# Patient Record
Sex: FEMALE | Race: OTHER | Employment: UNEMPLOYED | URBAN - METROPOLITAN AREA
[De-identification: names, ages, dates, MRNs, and addresses within clinical notes are randomized per-mention and may not be internally consistent; named-entity substitution may affect disease eponyms.]

---

## 2018-10-19 ENCOUNTER — APPOINTMENT (OUTPATIENT)
Dept: GENERAL RADIOLOGY | Age: 53
End: 2018-10-19
Payer: MEDICAID

## 2018-10-19 ENCOUNTER — HOSPITAL ENCOUNTER (EMERGENCY)
Age: 53
Discharge: HOME | End: 2018-10-19
Payer: MEDICAID

## 2018-10-19 VITALS
OXYGEN SATURATION: 100 % | DIASTOLIC BLOOD PRESSURE: 70 MMHG | HEART RATE: 78 BPM | TEMPERATURE: 97.9 F | RESPIRATION RATE: 12 BRPM | SYSTOLIC BLOOD PRESSURE: 114 MMHG

## 2018-10-19 DIAGNOSIS — R73.9 HYPERGLYCEMIA: Primary | ICD-10-CM

## 2018-10-19 LAB
ALBUMIN SERPL-MCNC: 4.3 G/DL (ref 3.9–4.9)
ALP BLD-CCNC: 147 U/L (ref 40–130)
ALT SERPL-CCNC: 38 U/L (ref 0–33)
ANION GAP SERPL CALCULATED.3IONS-SCNC: 14 MEQ/L (ref 7–13)
AST SERPL-CCNC: 19 U/L (ref 0–35)
BASOPHILS ABSOLUTE: 0.1 K/UL (ref 0–0.2)
BASOPHILS RELATIVE PERCENT: 1 %
BETA-HYDROXYBUTYRATE: 1.7 MG/DL (ref 0.2–2.8)
BILIRUB SERPL-MCNC: 0.3 MG/DL (ref 0–1.2)
BUN BLDV-MCNC: 20 MG/DL (ref 6–20)
CALCIUM SERPL-MCNC: 10.3 MG/DL (ref 8.6–10.2)
CHLORIDE BLD-SCNC: 92 MEQ/L (ref 98–107)
CO2: 27 MEQ/L (ref 22–29)
CREAT SERPL-MCNC: 0.55 MG/DL (ref 0.5–0.9)
EOSINOPHILS ABSOLUTE: 0.2 K/UL (ref 0–0.7)
EOSINOPHILS RELATIVE PERCENT: 1.5 %
GFR AFRICAN AMERICAN: >60
GFR NON-AFRICAN AMERICAN: >60
GLOBULIN: 3.1 G/DL (ref 2.3–3.5)
GLUCOSE BLD-MCNC: 291 MG/DL (ref 60–115)
GLUCOSE BLD-MCNC: 366 MG/DL (ref 60–115)
GLUCOSE BLD-MCNC: 408 MG/DL (ref 74–109)
HCT VFR BLD CALC: 44.8 % (ref 37–47)
HEMOGLOBIN: 15.1 G/DL (ref 12–16)
LYMPHOCYTES ABSOLUTE: 2.8 K/UL (ref 1–4.8)
LYMPHOCYTES RELATIVE PERCENT: 26.5 %
MCH RBC QN AUTO: 31.6 PG (ref 27–31.3)
MCHC RBC AUTO-ENTMCNC: 33.8 % (ref 33–37)
MCV RBC AUTO: 93.4 FL (ref 82–100)
MONOCYTES ABSOLUTE: 0.8 K/UL (ref 0.2–0.8)
MONOCYTES RELATIVE PERCENT: 7.3 %
NEUTROPHILS ABSOLUTE: 6.6 K/UL (ref 1.4–6.5)
NEUTROPHILS RELATIVE PERCENT: 63.7 %
PDW BLD-RTO: 13 % (ref 11.5–14.5)
PERFORMED ON: ABNORMAL
PERFORMED ON: ABNORMAL
PLATELET # BLD: 253 K/UL (ref 130–400)
POTASSIUM SERPL-SCNC: 4.7 MEQ/L (ref 3.5–5.1)
RBC # BLD: 4.8 M/UL (ref 4.2–5.4)
SODIUM BLD-SCNC: 133 MEQ/L (ref 132–144)
TOTAL PROTEIN: 7.4 G/DL (ref 6.4–8.1)
WBC # BLD: 10.4 K/UL (ref 4.8–10.8)

## 2018-10-19 PROCEDURE — 85025 COMPLETE CBC W/AUTO DIFF WBC: CPT

## 2018-10-19 PROCEDURE — 71045 X-RAY EXAM CHEST 1 VIEW: CPT

## 2018-10-19 PROCEDURE — 2580000003 HC RX 258: Performed by: NURSE PRACTITIONER

## 2018-10-19 PROCEDURE — 36415 COLL VENOUS BLD VENIPUNCTURE: CPT

## 2018-10-19 PROCEDURE — 80053 COMPREHEN METABOLIC PANEL: CPT

## 2018-10-19 PROCEDURE — 82010 KETONE BODYS QUAN: CPT

## 2018-10-19 PROCEDURE — 99285 EMERGENCY DEPT VISIT HI MDM: CPT

## 2018-10-19 RX ORDER — 0.9 % SODIUM CHLORIDE 0.9 %
2000 INTRAVENOUS SOLUTION INTRAVENOUS ONCE
Status: COMPLETED | OUTPATIENT
Start: 2018-10-19 | End: 2018-10-19

## 2018-10-19 RX ADMIN — SODIUM CHLORIDE 2000 ML: 9 INJECTION, SOLUTION INTRAVENOUS at 13:01

## 2018-10-19 ASSESSMENT — ENCOUNTER SYMPTOMS
BACK PAIN: 0
EYE PAIN: 0
NAUSEA: 0
VOMITING: 0
ABDOMINAL PAIN: 0
DIARRHEA: 0
PHOTOPHOBIA: 0
RHINORRHEA: 0
SHORTNESS OF BREATH: 0
SORE THROAT: 0
COUGH: 0

## 2018-10-19 NOTE — ED NOTES
Pt alert talking on cell phone at this time, denies any pain, denies a blanket at this time     Nicolasa Valenzuela RN  10/19/18 [id number]

## 2022-08-22 ENCOUNTER — HOSPITAL ENCOUNTER (OUTPATIENT)
Dept: GENERAL RADIOLOGY | Age: 57
Discharge: HOME | End: 2022-08-24
Payer: MEDICAID

## 2022-08-22 DIAGNOSIS — S90.32XA CONTUSION OF LEFT FOOT, INITIAL ENCOUNTER: ICD-10-CM

## 2022-08-22 PROCEDURE — 73650 X-RAY EXAM OF HEEL: CPT

## 2022-10-27 ENCOUNTER — HOSPITAL ENCOUNTER (EMERGENCY)
Age: 57
Discharge: HOME | End: 2022-10-27
Attending: EMERGENCY MEDICINE
Payer: MEDICAID

## 2022-10-27 VITALS
BODY MASS INDEX: 18.4 KG/M2 | HEIGHT: 62 IN | SYSTOLIC BLOOD PRESSURE: 147 MMHG | DIASTOLIC BLOOD PRESSURE: 66 MMHG | HEART RATE: 85 BPM | RESPIRATION RATE: 18 BRPM | WEIGHT: 100 LBS | TEMPERATURE: 98.1 F | OXYGEN SATURATION: 100 %

## 2022-10-27 DIAGNOSIS — R73.9 HYPERGLYCEMIA: Primary | ICD-10-CM

## 2022-10-27 LAB
ALBUMIN SERPL-MCNC: 2.5 G/DL (ref 3.5–4.6)
ALP BLD-CCNC: 121 U/L (ref 40–130)
ALT SERPL-CCNC: 19 U/L (ref 0–33)
ANION GAP SERPL CALCULATED.3IONS-SCNC: 8 MEQ/L (ref 9–15)
AST SERPL-CCNC: 9 U/L (ref 0–35)
BASE EXCESS VENOUS: -2 (ref -3–3)
BASOPHILS ABSOLUTE: 0 K/UL (ref 0–0.2)
BASOPHILS RELATIVE PERCENT: 0.4 %
BETA-HYDROXYBUTYRATE: 4 MG/DL (ref 0.2–2.8)
BILIRUB SERPL-MCNC: <0.2 MG/DL (ref 0.2–0.7)
BUN BLDV-MCNC: 13 MG/DL (ref 6–20)
CALCIUM IONIZED: 1.09 MMOL/L (ref 1.12–1.32)
CALCIUM SERPL-MCNC: 6.2 MG/DL (ref 8.5–9.9)
CHLORIDE BLD-SCNC: 114 MEQ/L (ref 95–107)
CHP ED QC CHECK: YES
CO2: 20 MEQ/L (ref 20–31)
CREAT SERPL-MCNC: 0.43 MG/DL (ref 0.5–0.9)
EOSINOPHILS ABSOLUTE: 0.1 K/UL (ref 0–0.7)
EOSINOPHILS RELATIVE PERCENT: 1.1 %
GFR SERPL CREATININE-BSD FRML MDRD: >60 ML/MIN/{1.73_M2}
GFR SERPL CREATININE-BSD FRML MDRD: >60 ML/MIN/{1.73_M2}
GLOBULIN: 1.7 G/DL (ref 2.3–3.5)
GLUCOSE BLD-MCNC: 326 MG/DL (ref 70–99)
GLUCOSE BLD-MCNC: 386 MG/DL (ref 70–99)
GLUCOSE BLD-MCNC: 412 MG/DL (ref 70–99)
GLUCOSE BLD-MCNC: 515 MG/DL
GLUCOSE BLD-MCNC: 515 MG/DL (ref 70–99)
HCO3 VENOUS: 23.5 MMOL/L (ref 23–29)
HCT VFR BLD CALC: 37.5 % (ref 37–47)
HEMOGLOBIN: 12.6 G/DL (ref 12–16)
HEMOGLOBIN: 15.6 GM/DL (ref 12–16)
LACTATE: 1.32 MMOL/L (ref 0.4–2)
LYMPHOCYTES ABSOLUTE: 2.5 K/UL (ref 1–4.8)
LYMPHOCYTES RELATIVE PERCENT: 29.1 %
MCH RBC QN AUTO: 31.8 PG (ref 27–31.3)
MCHC RBC AUTO-ENTMCNC: 33.8 % (ref 33–37)
MCV RBC AUTO: 94.2 FL (ref 79.4–94.8)
MONOCYTES ABSOLUTE: 0.5 K/UL (ref 0.2–0.8)
MONOCYTES RELATIVE PERCENT: 5.8 %
NEUTROPHILS ABSOLUTE: 5.5 K/UL (ref 1.4–6.5)
NEUTROPHILS RELATIVE PERCENT: 63.6 %
O2 SAT, VEN: 47 %
PCO2, VEN: 43.7 MM HG (ref 40–50)
PDW BLD-RTO: 13 % (ref 11.5–14.5)
PERFORMED ON: ABNORMAL
PH VENOUS: 7.34 (ref 7.32–7.42)
PLATELET # BLD: 129 K/UL (ref 130–400)
PO2, VEN: 28 MM HG
POC CHLORIDE: 107 MEQ/L (ref 99–110)
POC CREATININE: 0.5 MG/DL (ref 0.6–1.2)
POC HEMATOCRIT: 46 % (ref 36–48)
POC POTASSIUM: 3.7 MEQ/L (ref 3.5–5.1)
POC SAMPLE TYPE: ABNORMAL
POC SODIUM: 140 MEQ/L (ref 136–145)
POTASSIUM SERPL-SCNC: 3 MEQ/L (ref 3.4–4.9)
RBC # BLD: 3.98 M/UL (ref 4.2–5.4)
SODIUM BLD-SCNC: 142 MEQ/L (ref 135–144)
TCO2 CALC VENOUS: 25 MMOL/L
TOTAL PROTEIN: 4.2 G/DL (ref 6.3–8)
WBC # BLD: 8.6 K/UL (ref 4.8–10.8)

## 2022-10-27 PROCEDURE — 99284 EMERGENCY DEPT VISIT MOD MDM: CPT

## 2022-10-27 PROCEDURE — 83605 ASSAY OF LACTIC ACID: CPT

## 2022-10-27 PROCEDURE — 36415 COLL VENOUS BLD VENIPUNCTURE: CPT

## 2022-10-27 PROCEDURE — 36600 WITHDRAWAL OF ARTERIAL BLOOD: CPT

## 2022-10-27 PROCEDURE — 85014 HEMATOCRIT: CPT

## 2022-10-27 PROCEDURE — 94761 N-INVAS EAR/PLS OXIMETRY MLT: CPT

## 2022-10-27 PROCEDURE — 80053 COMPREHEN METABOLIC PANEL: CPT

## 2022-10-27 PROCEDURE — 82010 KETONE BODYS QUAN: CPT

## 2022-10-27 PROCEDURE — 82803 BLOOD GASES ANY COMBINATION: CPT

## 2022-10-27 PROCEDURE — 84132 ASSAY OF SERUM POTASSIUM: CPT

## 2022-10-27 PROCEDURE — 6370000000 HC RX 637 (ALT 250 FOR IP): Performed by: EMERGENCY MEDICINE

## 2022-10-27 PROCEDURE — 82565 ASSAY OF CREATININE: CPT

## 2022-10-27 PROCEDURE — 2580000003 HC RX 258: Performed by: EMERGENCY MEDICINE

## 2022-10-27 PROCEDURE — 82435 ASSAY OF BLOOD CHLORIDE: CPT

## 2022-10-27 PROCEDURE — 82948 REAGENT STRIP/BLOOD GLUCOSE: CPT

## 2022-10-27 PROCEDURE — 82330 ASSAY OF CALCIUM: CPT

## 2022-10-27 PROCEDURE — 85025 COMPLETE CBC W/AUTO DIFF WBC: CPT

## 2022-10-27 PROCEDURE — 84295 ASSAY OF SERUM SODIUM: CPT

## 2022-10-27 RX ORDER — CLONIDINE HYDROCHLORIDE 0.2 MG/1
0.2 TABLET ORAL 2 TIMES DAILY
COMMUNITY

## 2022-10-27 RX ORDER — UREA 10 %
LOTION (ML) TOPICAL
COMMUNITY
Start: 2022-09-15

## 2022-10-27 RX ORDER — CLONIDINE HYDROCHLORIDE 0.2 MG/1
TABLET ORAL
COMMUNITY
Start: 2022-09-15

## 2022-10-27 RX ORDER — RANITIDINE 150 MG/1
150 TABLET ORAL 2 TIMES DAILY
COMMUNITY

## 2022-10-27 RX ORDER — ISOPROPYL ALCOHOL 70 ML/100ML
SWAB TOPICAL
COMMUNITY
Start: 2022-09-06

## 2022-10-27 RX ORDER — ACETAMINOPHEN 325 MG/1
TABLET ORAL
COMMUNITY
Start: 2022-09-15

## 2022-10-27 RX ORDER — GLIMEPIRIDE 1 MG/1
TABLET ORAL
COMMUNITY
Start: 2022-09-15

## 2022-10-27 RX ORDER — ENALAPRIL MALEATE 10 MG/1
10 TABLET ORAL 2 TIMES DAILY
COMMUNITY

## 2022-10-27 RX ORDER — MELOXICAM 15 MG/1
TABLET ORAL
COMMUNITY
Start: 2022-08-12

## 2022-10-27 RX ORDER — GLYBURIDE-METFORMIN HYDROCHLORIDE 5; 500 MG/1; MG/1
1 TABLET ORAL
COMMUNITY

## 2022-10-27 RX ORDER — CEPHALEXIN 500 MG/1
CAPSULE ORAL
COMMUNITY
Start: 2022-08-12

## 2022-10-27 RX ORDER — 0.9 % SODIUM CHLORIDE 0.9 %
1000 INTRAVENOUS SOLUTION INTRAVENOUS ONCE
Status: COMPLETED | OUTPATIENT
Start: 2022-10-27 | End: 2022-10-27

## 2022-10-27 RX ORDER — SITAGLIPTIN 100 MG/1
TABLET, FILM COATED ORAL
COMMUNITY
Start: 2022-09-20

## 2022-10-27 RX ORDER — 0.9 % SODIUM CHLORIDE 0.9 %
2000 INTRAVENOUS SOLUTION INTRAVENOUS ONCE
Status: DISCONTINUED | OUTPATIENT
Start: 2022-10-27 | End: 2022-10-27

## 2022-10-27 RX ORDER — CLOPIDOGREL BISULFATE 75 MG/1
75 TABLET ORAL DAILY
COMMUNITY

## 2022-10-27 RX ORDER — BLOOD SUGAR DIAGNOSTIC
STRIP MISCELLANEOUS
COMMUNITY
Start: 2022-08-29

## 2022-10-27 RX ORDER — SIMVASTATIN 20 MG
20 TABLET ORAL NIGHTLY
COMMUNITY

## 2022-10-27 RX ORDER — LISINOPRIL 20 MG/1
TABLET ORAL
COMMUNITY
Start: 2022-10-04

## 2022-10-27 RX ORDER — POTASSIUM BICARBONATE 25 MEQ/1
50 TABLET, EFFERVESCENT ORAL ONCE
Status: COMPLETED | OUTPATIENT
Start: 2022-10-27 | End: 2022-10-27

## 2022-10-27 RX ORDER — CLINDAMYCIN HYDROCHLORIDE 300 MG/1
CAPSULE ORAL
COMMUNITY
Start: 2022-08-09

## 2022-10-27 RX ORDER — ATORVASTATIN CALCIUM 80 MG/1
TABLET, FILM COATED ORAL
COMMUNITY
Start: 2022-09-15

## 2022-10-27 RX ORDER — MIRTAZAPINE 15 MG/1
TABLET, FILM COATED ORAL
COMMUNITY
Start: 2022-09-15

## 2022-10-27 RX ORDER — LISINOPRIL 20 MG/1
20 TABLET ORAL DAILY
COMMUNITY

## 2022-10-27 RX ORDER — LANCETS 30 GAUGE
EACH MISCELLANEOUS
COMMUNITY
Start: 2022-08-15

## 2022-10-27 RX ADMIN — POTASSIUM BICARBONATE 50 MEQ: 978 TABLET, EFFERVESCENT ORAL at 15:58

## 2022-10-27 RX ADMIN — SODIUM CHLORIDE 1000 ML: 9 INJECTION, SOLUTION INTRAVENOUS at 14:34

## 2022-10-27 ASSESSMENT — PAIN - FUNCTIONAL ASSESSMENT
PAIN_FUNCTIONAL_ASSESSMENT: NONE - DENIES PAIN
PAIN_FUNCTIONAL_ASSESSMENT: NONE - DENIES PAIN

## 2022-10-27 NOTE — ED PROVIDER NOTES
[address] [facility] ED  EMERGENCY DEPARTMENT ENCOUNTER      Pt Name: Bogdan Jamison  MRN: [id number]  [place] 1965  Date of evaluation: 10/27/2022  Provider: Len Marx MD    CHIEF COMPLAINT       Chief Complaint   Patient presents with    Hyperglycemia         HISTORY OF PRESENT ILLNESS   (Location/Symptom, Timing/Onset, Context/Setting, Quality, Duration, Modifying Factors, Severity)  Note limiting factors. 22-year-old female presenting with elevated blood sugar. Sent from primary doctor. She has no complaints. History of diabetes. Unsure if she is taking her medications. Nursing Notes were reviewed. REVIEW OF SYSTEMS    (2-9 systems for level 4, 10 or more for level 5)     Review of Systems   All other systems reviewed and are negative. Except as noted above the remainder of the review of systems was reviewed and negative. PAST MEDICAL HISTORY     Past Medical History:   Diagnosis Date    Asthma     Diabetes mellitus ([address]     Hypertension     Migraines          SURGICAL HISTORY     History reviewed. No pertinent surgical history.       CURRENT MEDICATIONS       Current Discharge Medication List        CONTINUE these medications which have NOT CHANGED    Details   Multiple Vitamins-Minerals (MULTI FOR HER 50+ PO)       acetaminophen (TYLENOL) 325 MG tablet       GNP Alcohol Swabs 70 % PADS       atorvastatin (LIPITOR) 80 MG tablet       Calcium Carb-Cholecalciferol (OYSTER SHELL CALCIUM W/D) 500-200 MG-UNIT TABS tablet       cephALEXin (KEFLEX) 500 MG capsule       clindamycin (CLEOCIN) 300 MG capsule       !! cloNIDine (CATAPRES) 0.2 MG tablet       !! cloNIDine (CATAPRES) 0.2 MG tablet Take 0.2 mg by mouth 2 times daily      clopidogrel (PLAVIX) 75 MG tablet Take 75 mg by mouth daily      enalapril (VASOTEC) 10 MG tablet Take 10 mg by mouth 2 times daily      folic acid (FOLVITE) 080 MCG tablet       glimepiride (AMARYL) 1 MG tablet       ONETOUCH ULTRA strip glyBURIDE-metFORMIN (GLUCOVANCE) 5-500 MG per tablet Take 1 tablet by mouth daily (with breakfast)      Lancets (BD LANCET ULTRAFINE 30G) MISC       !! lisinopril (PRINIVIL;ZESTRIL) 20 MG tablet       !! lisinopril (PRINIVIL;ZESTRIL) 20 MG tablet Take 20 mg by mouth daily      meloxicam (MOBIC) 15 MG tablet       !! metFORMIN (GLUCOPHAGE) 500 MG tablet Take 500 mg by mouth 2 times daily (with meals)      !! metFORMIN (GLUCOPHAGE) 1000 MG tablet       mirtazapine (REMERON) 15 MG tablet       mupirocin (BACTROBAN) 2 % ointment       raNITIdine (ZANTAC) 150 MG tablet Take 150 mg by mouth 2 times daily      simvastatin (ZOCOR) 20 MG tablet Take 20 mg by mouth nightly      JANUVIA 100 MG tablet       traMADol-acetaminophen (ULTRACET) 37.5-325 MG per tablet        !! - Potential duplicate medications found. Please discuss with provider. ALLERGIES     Patient has no allergy information on record. FAMILY HISTORY     History reviewed. No pertinent family history. SOCIAL HISTORY       Social History     Socioeconomic History    Marital status: Single     Spouse name: None    Number of children: None    Years of education: None    Highest education level: None   Tobacco Use    Smoking status: Every Day     Types: Cigarettes    Smokeless tobacco: Never   Substance and Sexual Activity    Alcohol use: Not Currently    Drug use: Never       SCREENINGS    Ricardo Coma Scale  Eye Opening: Spontaneous  Best Verbal Response: Oriented  Best Motor Response: Obeys commands  Monon Coma Scale Score: 15          PHYSICAL EXAM    (up to 7 for level 4, 8 or more for level 5)     ED Triage Vitals [10/27/22 1300]   BP Temp Temp src Heart Rate Resp SpO2 Height Weight   135/69 98.1 °F (36.7 °C) -- 89 18 99 % 5' 3\" (1.6 m) 120 lb (54.4 kg)       Physical Exam  Vitals and nursing note reviewed. Constitutional:       General: She is not in acute distress. Appearance: Normal appearance. She is well-developed.  She is not ill-appearing. HENT:      Head: Normocephalic and atraumatic. Mouth/Throat:      Mouth: Mucous membranes are moist.      Pharynx: Oropharynx is clear. Eyes:      Extraocular Movements: Extraocular movements intact. Conjunctiva/sclera: Conjunctivae normal.   Cardiovascular:      Rate and Rhythm: Normal rate and regular rhythm. Pulmonary:      Effort: Pulmonary effort is normal.      Breath sounds: Normal breath sounds. Abdominal:      General: Bowel sounds are normal.      Palpations: Abdomen is soft. Tenderness: There is no abdominal tenderness. Musculoskeletal:         General: No deformity. Normal range of motion. Cervical back: Normal range of motion and neck supple. Skin:     General: Skin is warm and dry. Capillary Refill: Capillary refill takes less than 2 seconds. Neurological:      General: No focal deficit present. Mental Status: She is alert and oriented to person, place, and time. Mental status is at baseline. Cranial Nerves: No cranial nerve deficit. Psychiatric:         Thought Content:  Thought content normal.       DIAGNOSTIC RESULTS     EKG: All EKG's are interpreted by the Emergency Department Physician who either signs or Co-signs this chart in the absence of a cardiologist.    RADIOLOGY:   Non-plain film images such as CT, Ultrasound and MRI are read by the radiologist. Plain radiographic images are visualized and preliminarily interpreted by the emergency physician with the below findings:    Interpretation per the Radiologist below, if available at the time of this note:    No orders to display       LABS:  Labs Reviewed   COMPREHENSIVE METABOLIC PANEL - Abnormal; Notable for the following components:       Result Value    Potassium 3.0 (*)     Chloride 114 (*)     Anion Gap 8 (*)     Glucose 386 (*)     Creatinine 0.43 (*)     Calcium 6.2 (*)     Total Protein 4.2 (*)     Albumin 2.5 (*)     Globulin 1.7 (*)     All other components within normal limits    Narrative:     Monique Hernandez  [facility] tel. [id number],  POTASSIUM results called to and read back by [facility], 10/27/2022  14:36, by [facility]   CBC WITH AUTO DIFFERENTIAL - Abnormal; Notable for the following components:    RBC 3.98 (*)     MCH 31.8 (*)     Platelets 698 (*)     All other components within normal limits   BETA-HYDROXYBUTYRATE - Abnormal; Notable for the following components:    Beta-Hydroxybutyrate 4.0 (*)     All other components within normal limits   POCT GLUCOSE - Abnormal; Notable for the following components:    POC Glucose 515 (*)     All other components within normal limits   POCT VENOUS - Abnormal; Notable for the following components:    POC Glucose 412 (*)     POC Creatinine 0.5 (*)     Calcium, Ionized 1.09 (*)     All other components within normal limits   POCT GLUCOSE - Normal   POCT EPOC BLOOD GAS, LACTIC ACID, ICA       All other labs were within normal range or not returned as of this dictation. EMERGENCY DEPARTMENT COURSE and DIFFERENTIAL DIAGNOSIS/MDM:   Vitals:    Vitals:    10/27/22 1445 10/27/22 1500 10/27/22 1505 10/27/22 1530   BP:  (!) 139/55  (!) 147/66   Pulse: 79 78 81 85   Resp: 18 16 20 18   Temp:       SpO2: 100% 100% 100% 100%   Weight:       Height:           MDM  Number of Diagnoses or Management Options  Hyperglycemia  Diagnosis management comments: After fluids sugar is improved, instructed her to restart insulin regimen as prescribed. Patient will be discharged home in good condition. Patient has been hemodynamically stable throughout ED course and is appropriate for outpatient follow up. Patient should follow up with PCP in 2-3 days or return to ED immediately for any new or worsening symptoms. Patient is well appearing on discharge and agreeable with plan of care. Procedures    CRITICAL CARE TIME   Total Critical Care time was 0 minutes, excluding separately reportable procedures.   There was a high probability of clinically significant/life threatening deterioration in the patient's condition which required my urgent intervention. FINAL IMPRESSION      1.  Hyperglycemia          DISPOSITION/PLAN   DISPOSITION Decision To Discharge 10/27/2022 03:54:23 PM      (Please note that portions of this note were completed with a voice recognition program.  Efforts were made to edit the dictations but occasionally words are mis-transcribed.)    Selina Dumas MD (electronically signed)  Attending Emergency Physician        Selina Dumas MD  10/27/22 1600

## 2022-10-27 NOTE — ED TRIAGE NOTES
Pt states that she has been having high BGL reading for a long time. Pt states that she takes insulin. Pt states that she was at her PCP for a follow up and was sent here.

## 2022-10-27 NOTE — ED TRIAGE NOTES
Per EMS pt has been having increased BGL for x2 months.  Pt went to [facility] today and was sent by EMS here for eval of increased BGL

## 2022-10-27 NOTE — ED NOTES
Blood glucose 326, Dr. Conrad Gift updated, 0 new orders, per  [id number] G[facility] Lee to d/c pt home. Pt a&ox4, skin w/d/tan, 0 distress, 0 pain, 0 n&v, 0 problems.      Natalee Carcamo, RN  10/27/22 4288

## 2022-11-09 LAB
FOLATE: 9.8 NG/ML
TSH SERPL DL<=0.05 MIU/L-ACNC: 1.49 MIU/L (ref 0.44–3.9)
VITAMIN B-12: 651 PG/ML (ref 211–911)

## 2022-12-20 ENCOUNTER — OFFICE VISIT (OUTPATIENT)
Dept: ENDOCRINOLOGY | Age: 57
End: 2022-12-20

## 2022-12-20 VITALS
DIASTOLIC BLOOD PRESSURE: 78 MMHG | SYSTOLIC BLOOD PRESSURE: 129 MMHG | HEART RATE: 83 BPM | OXYGEN SATURATION: 97 % | BODY MASS INDEX: 23.74 KG/M2 | HEIGHT: 62 IN | WEIGHT: 129 LBS

## 2022-12-20 DIAGNOSIS — E11.65 POORLY CONTROLLED DIABETES MELLITUS (HCC): ICD-10-CM

## 2022-12-20 DIAGNOSIS — E11.65 INADEQUATELY CONTROLLED DIABETES MELLITUS (HCC): Primary | ICD-10-CM

## 2022-12-20 LAB
CHP ED QC CHECK: NORMAL
GLUCOSE BLD-MCNC: 146 MG/DL
HBA1C MFR BLD: 6.9 %

## 2022-12-20 RX ORDER — BLOOD-GLUCOSE METER
EACH MISCELLANEOUS
COMMUNITY
Start: 2022-11-12

## 2022-12-20 RX ORDER — LOPERAMIDE HYDROCHLORIDE 2 MG/1
CAPSULE ORAL
COMMUNITY
Start: 2022-12-14

## 2022-12-20 RX ORDER — INSULIN LISPRO 100 [IU]/ML
INJECTION, SOLUTION INTRAVENOUS; SUBCUTANEOUS
COMMUNITY
Start: 2022-11-11 | End: 2022-12-20 | Stop reason: SDUPTHER

## 2022-12-20 RX ORDER — DM/P-EPHED/ACETAMINOPH/DOXYLAM
LIQUID (ML) ORAL
COMMUNITY
Start: 2022-11-11

## 2022-12-20 RX ORDER — FLASH GLUCOSE SENSOR
KIT MISCELLANEOUS
Qty: 2 EACH | Refills: 4 | Status: SHIPPED | OUTPATIENT
Start: 2022-12-20

## 2022-12-20 RX ORDER — PIOGLITAZONEHYDROCHLORIDE 30 MG/1
TABLET ORAL
COMMUNITY
Start: 2022-11-11

## 2022-12-20 RX ORDER — FLASH GLUCOSE SCANNING READER
EACH MISCELLANEOUS
Qty: 1 EACH | Refills: 0 | Status: SHIPPED | OUTPATIENT
Start: 2022-12-20

## 2022-12-20 RX ORDER — INSULIN LISPRO 100 [IU]/ML
INJECTION, SOLUTION INTRAVENOUS; SUBCUTANEOUS
Qty: 10 ADJUSTABLE DOSE PRE-FILLED PEN SYRINGE | Refills: 3 | Status: SHIPPED | OUTPATIENT
Start: 2022-12-20

## 2022-12-20 RX ORDER — TRAMADOL HYDROCHLORIDE 50 MG/1
TABLET ORAL
COMMUNITY
Start: 2022-12-14

## 2022-12-20 RX ORDER — BLOOD-GLUCOSE METER
EACH MISCELLANEOUS
COMMUNITY
Start: 2022-12-07

## 2022-12-20 RX ORDER — INSULIN GLARGINE 100 [IU]/ML
INJECTION, SOLUTION SUBCUTANEOUS
Qty: 5 ADJUSTABLE DOSE PRE-FILLED PEN SYRINGE | Refills: 4 | Status: SHIPPED | OUTPATIENT
Start: 2022-12-20

## 2022-12-20 RX ORDER — ASPIRIN 81 MG/1
TABLET, COATED ORAL
COMMUNITY
Start: 2022-11-11

## 2022-12-20 RX ORDER — INSULIN GLARGINE 100 [IU]/ML
INJECTION, SOLUTION SUBCUTANEOUS
COMMUNITY
Start: 2022-11-11 | End: 2022-12-20 | Stop reason: SDUPTHER

## 2022-12-20 RX ORDER — NICOTINE 21 MG/24HR
PATCH, TRANSDERMAL 24 HOURS TRANSDERMAL
COMMUNITY
Start: 2022-12-14

## 2022-12-20 RX ORDER — ATORVASTATIN CALCIUM 10 MG/1
TABLET, FILM COATED ORAL
COMMUNITY
Start: 2022-12-14

## 2022-12-20 RX ORDER — BUSPIRONE HYDROCHLORIDE 10 MG/1
TABLET ORAL
COMMUNITY
Start: 2022-11-15

## 2022-12-20 RX ORDER — PANTOPRAZOLE SODIUM 40 MG/1
TABLET, DELAYED RELEASE ORAL
COMMUNITY
Start: 2022-11-11

## 2022-12-20 RX ORDER — UREA 10 %
LOTION (ML) TOPICAL
COMMUNITY
Start: 2022-12-19

## 2022-12-20 RX ORDER — FAMOTIDINE 20 MG/1
TABLET, FILM COATED ORAL
COMMUNITY
Start: 2022-12-14

## 2022-12-20 NOTE — PROGRESS NOTES
2022    Assessment:       Diagnosis Orders   1. Inadequately controlled diabetes mellitus ([facility]  POCT Glucose    POCT glycosylated hemoglobin (Hb A1C)      2. Poorly controlled diabetes mellitus ([facility] [address] [facility]              PLAN:     Orders Placed This Encounter   Procedures    Basic Metabolic Panel     Standing Status:   Future     Standing Expiration Date:   2023    Hemoglobin A1C     Standing Status:   Future     Standing Expiration Date:   2023    C-Peptide     Standing Status:   Future     Standing Expiration Date:   2023    Glutamic Acid Decarboxylase     Standing Status:   Future     Standing Expiration Date:   2023    POCT Glucose    POCT glycosylated hemoglobin (Hb A1C)    MT CONT GLUC MNTR PHYSICIAN/QHP PROVIDED EQUIPTMENT     2-week blessing continuous glucose monitoring  Increase dose of Lantus to 35 at night made adjustments to Humalog sliding scale coverage A1c goal of 7 or lower more than 50% of 30-minute spent patient education counseling  Orders Placed This Encounter   Medications    LANTUS SOLOSTAR 100 UNIT/ML injection pen     Si  units at bedtime     Dispense:  5 Adjustable Dose Pre-filled Pen Syringe     Refill:  4    insulin lispro, 1 Unit Dial, (HUMALOG/ADMELOG) 100 UNIT/ML SOPN     Si-200 4 units  201-250 6 units  251-300 8 units  310-350 10 units  351-400 12 units  401-450 14 units  451-500 16 units     Dispense:  10 Adjustable Dose Pre-filled Pen Syringe     Refill:  3    Continuous Blood Gluc Sensor (FREESTYLE BLESSING 2 SENSOR) MISC     Sig: Every 2 weeks     Dispense:  2 each     Refill:  4    Continuous Blood Gluc  (FREESTYLE BLESSING 2 READER) AKOSUA     Sig: As directed     Dispense:  1 each     Refill:  0         Orders Placed This Encounter   Procedures    POCT Glucose    POCT glycosylated hemoglobin (Hb A1C)     No orders of the defined types were placed in this encounter. No follow-ups on file.   Subjective:     Chief Complaint   Patient presents with 10-Feb-2021 New Patient    Diabetes     Vitals:    12/20/22 1600   BP: 129/78   Site: Right Upper Arm   Position: Sitting   Cuff Size: Medium Adult   Pulse: 83   SpO2: 97%   Weight: 129 lb (58.5 kg)   Height: 5' 2\" (1.575 m)     Wt Readings from Last 3 Encounters:   12/20/22 129 lb (58.5 kg)   10/27/22 100 lb (45.4 kg)     BP Readings from Last 3 Encounters:   12/20/22 129/78   10/27/22 (!) 147/66   10/19/18 114/70     Patient referred here for uncontrolled type 2 diabetes recently started on insulin injections seen in the emergency room for hypoglycemia history of peripheral vascular disease A1c done today was 6.9  Currently on Lantus 30 5 at night Humalog sliding scale  Has had diabetes for some time was on oral medications in the ER blood sugars were in the 400 range had increased hydroxybutyric acid    Diabetes  She presents for her initial diabetic visit. She has type 2 diabetes mellitus. Pertinent negatives for diabetes include no polydipsia and no polyuria. There are no hypoglycemic complications. Symptoms are improving. Diabetic complications include PVD. Current diabetic treatment includes insulin injections. She is currently taking insulin pre-breakfast, pre-lunch, pre-dinner and at bedtime. She is following a generally healthy diet. Her overall blood glucose range is 140-180 mg/dl. Past Medical History:   Diagnosis Date    Asthma     Diabetes mellitus ([address]     Hypertension     Migraines      No past surgical history on file.   Social History     Socioeconomic History    Marital status: Single     Spouse name: Not on file    Number of children: Not on file    Years of education: Not on file    Highest education level: Not on file   Occupational History    Not on file   Tobacco Use    Smoking status: Every Day     Types: Cigarettes    Smokeless tobacco: Never   Substance and Sexual Activity    Alcohol use: Not Currently    Drug use: Never    Sexual activity: Not on file   Other Topics Concern    Not on file Social History Narrative    Not on file     Social Determinants of Health     Financial Resource Strain: Not on file   Food Insecurity: Not on file   Transportation Needs: Not on file   Physical Activity: Not on file   Stress: Not on file   Social Connections: Not on file   Intimate Partner Violence: Not on file   Housing Stability: Not on file     No family history on file.   No Known Allergies    Current Outpatient Medications:     traMADol (ULTRAM) 50 MG tablet, , Disp: , Rfl:     pioglitazone (ACTOS) 30 MG tablet, , Disp: , Rfl:     pantoprazole (PROTONIX) 40 MG tablet, , Disp: , Rfl:     nicotine (NICODERM CQ) 21 MG/24HR, , Disp: , Rfl:     metoprolol tartrate (LOPRESSOR) 25 MG tablet, , Disp: , Rfl:     melatonin 1 MG tablet, , Disp: , Rfl:     loperamide (IMODIUM) 2 MG capsule, , Disp: , Rfl:     EASY COMFORT PEN NEEDLES 32G X 4 MM MISC, , Disp: , Rfl:     insulin lispro, 1 Unit Dial, (HUMALOG/ADMELOG) 100 UNIT/ML SOPN, , Disp: , Rfl:     LANTUS SOLOSTAR 100 UNIT/ML injection pen, , Disp: , Rfl:     famotidine (PEPCID) 20 MG tablet, , Disp: , Rfl:     D 5000 125 MCG (5000 UT) CAPS capsule, , Disp: , Rfl:     busPIRone (BUSPAR) 10 MG tablet, , Disp: , Rfl:     Blood Glucose Monitoring Suppl (ONE TOUCH ULTRA 2) w/Device KIT, , Disp: , Rfl:     atorvastatin (LIPITOR) 10 MG tablet, , Disp: , Rfl:     ASPIRIN LOW DOSE 81 MG EC tablet, , Disp: , Rfl:     Multiple Vitamins-Minerals (MULTI FOR HER 50+ PO), , Disp: , Rfl:     acetaminophen (TYLENOL) 325 MG tablet, , Disp: , Rfl:     GNP Alcohol Swabs 70 % PADS, , Disp: , Rfl:     Calcium Carb-Cholecalciferol (OYSTER SHELL CALCIUM W/D) 500-200 MG-UNIT TABS tablet, , Disp: , Rfl:     clindamycin (CLEOCIN) 300 MG capsule, , Disp: , Rfl:     cloNIDine (CATAPRES) 0.2 MG tablet, , Disp: , Rfl:     clopidogrel (PLAVIX) 75 MG tablet, Take 75 mg by mouth daily, Disp: , Rfl:     enalapril (VASOTEC) 10 MG tablet, Take 10 mg by mouth 2 times daily, Disp: , Rfl:     folic acid (FOLVITE) 800 MCG tablet, , Disp: , Rfl:     glimepiride (AMARYL) 1 MG tablet, , Disp: , Rfl:     ONETOUCH ULTRA strip, , Disp: , Rfl:     glyBURIDE-metFORMIN (GLUCOVANCE) 5-500 MG per tablet, Take 1 tablet by mouth daily (with breakfast), Disp: , Rfl:     Lancets (BD LANCET ULTRAFINE 30G) MISC, , Disp: , Rfl:     lisinopril (PRINIVIL;ZESTRIL) 20 MG tablet, , Disp: , Rfl:     meloxicam (MOBIC) 15 MG tablet, , Disp: , Rfl:     metFORMIN (GLUCOPHAGE) 1000 MG tablet, , Disp: , Rfl:     mirtazapine (REMERON) 15 MG tablet, , Disp: , Rfl:     mupirocin (BACTROBAN) 2 % ointment, , Disp: , Rfl:     raNITIdine (ZANTAC) 150 MG tablet, Take 150 mg by mouth 2 times daily, Disp: , Rfl:     simvastatin (ZOCOR) 20 MG tablet, Take 20 mg by mouth nightly, Disp: , Rfl:     JANUVIA 100 MG tablet, , Disp: , Rfl:     traMADol-acetaminophen (ULTRACET) 37.5-325 MG per tablet, , Disp: , Rfl:   Lab Results   Component Value Date     10/27/2022    K 3.0 (LL) 10/27/2022     (H) 10/27/2022    CO2 20 10/27/2022    BUN 13 10/27/2022    CREATININE 0.5 (L) 10/27/2022    GLUCOSE 146 12/20/2022    CALCIUM 6.2 (L) 10/27/2022    PROT 4.2 (L) 10/27/2022    LABALBU 2.5 (L) 10/27/2022    BILITOT <0.2 10/27/2022    ALKPHOS 121 10/27/2022    AST 9 10/27/2022    ALT 19 10/27/2022    LABGLOM >60 10/27/2022    GFRAA >60.0 10/19/2018    GLOB 1.7 (L) 10/27/2022     Lab Results   Component Value Date    WBC 8.6 10/27/2022    HGB 15.6 10/27/2022    HCT 37.5 10/27/2022    MCV 94.2 10/27/2022     (L) 10/27/2022     Lab Results   Component Value Date    LABA1C 6.9 12/20/2022     No results found for: CHOLFAST, TRIGLYCFAST, HDL, LDLCALC, CHOL, TRIG  No results found for: TESTM  Lab Results   Component Value Date    TSH 1.49 11/09/2022     No results found for: TPOABS    Review of Systems   Eyes: Negative. Cardiovascular: Negative. Endocrine: Negative for polydipsia and polyuria. Skin:  Positive for color change.    All other systems reviewed

## 2022-12-24 ASSESSMENT — ENCOUNTER SYMPTOMS
EYES NEGATIVE: 1
COLOR CHANGE: 1

## 2023-01-18 ENCOUNTER — OFFICE VISIT (OUTPATIENT)
Dept: ENDOCRINOLOGY | Age: 58
End: 2023-01-18
Payer: MEDICAID

## 2023-01-18 VITALS
HEIGHT: 62 IN | SYSTOLIC BLOOD PRESSURE: 140 MMHG | WEIGHT: 110 LBS | DIASTOLIC BLOOD PRESSURE: 81 MMHG | OXYGEN SATURATION: 96 % | HEART RATE: 95 BPM | BODY MASS INDEX: 20.24 KG/M2

## 2023-01-18 DIAGNOSIS — E11.65 INADEQUATELY CONTROLLED DIABETES MELLITUS (HCC): Primary | ICD-10-CM

## 2023-01-18 LAB
CHP ED QC CHECK: NORMAL
GLUCOSE BLD-MCNC: 225 MG/DL

## 2023-01-18 PROCEDURE — 2022F DILAT RTA XM EVC RTNOPTHY: CPT | Performed by: INTERNAL MEDICINE

## 2023-01-18 PROCEDURE — 3046F HEMOGLOBIN A1C LEVEL >9.0%: CPT | Performed by: INTERNAL MEDICINE

## 2023-01-18 PROCEDURE — 95251 CONT GLUC MNTR ANALYSIS I&R: CPT | Performed by: INTERNAL MEDICINE

## 2023-01-18 PROCEDURE — 99213 OFFICE O/P EST LOW 20 MIN: CPT | Performed by: INTERNAL MEDICINE

## 2023-01-18 PROCEDURE — G8427 DOCREV CUR MEDS BY ELIG CLIN: HCPCS | Performed by: INTERNAL MEDICINE

## 2023-01-18 PROCEDURE — 4004F PT TOBACCO SCREEN RCVD TLK: CPT | Performed by: INTERNAL MEDICINE

## 2023-01-18 PROCEDURE — G8420 CALC BMI NORM PARAMETERS: HCPCS | Performed by: INTERNAL MEDICINE

## 2023-01-18 PROCEDURE — G8484 FLU IMMUNIZE NO ADMIN: HCPCS | Performed by: INTERNAL MEDICINE

## 2023-01-18 PROCEDURE — 3017F COLORECTAL CA SCREEN DOC REV: CPT | Performed by: INTERNAL MEDICINE

## 2023-01-18 NOTE — PROGRESS NOTES
1/18/2023    Assessment:       Diagnosis Orders   1. Inadequately controlled diabetes mellitus ([address]  POCT Glucose            PLAN:     Orders Placed This Encounter   Procedures    Lipid Panel     Standing Status:   Future     Standing Expiration Date:   3/97/8212    Basic Metabolic Panel     Standing Status:   Future     Standing Expiration Date:   1/18/2024    POCT Glucose     Continue current dose of Lantus 35 at bedtime plus Humalog on a sliding scale follow-up in 2 to 3 months time    Orders Placed This Encounter   Procedures    POCT Glucose     No orders of the defined types were placed in this encounter. No follow-ups on file. Subjective:     Chief Complaint   Patient presents with    Diabetes     Vitals:    01/18/23 1535 01/18/23 1538   BP: (!) 140/81 (!) 140/81   Pulse: 95    SpO2: 96%    Weight: 110 lb (49.9 kg)    Height: 5' 2\" (1.575 m)      Wt Readings from Last 3 Encounters:   01/18/23 110 lb (49.9 kg)   12/20/22 129 lb (58.5 kg)   10/27/22 100 lb (45.4 kg)     BP Readings from Last 3 Encounters:   01/18/23 (!) 140/81   12/20/22 129/78   10/27/22 (!) 147/66     Follow-up on type 2 diabetes patient is on Lantus 20 5 at night Humalog on a sliding scale had 2-week rosa isela done which was reviewed average blood sugar was 137 79% in range 40% was slightly high 2% very high  3% mild hypoglycemia 2% severe hypoglycemia      Diabetes  She presents for her follow-up diabetic visit. She has type 2 diabetes mellitus. Pertinent negatives for diabetes include no polyuria and no weight loss. Symptoms are stable. Current diabetic treatment includes insulin injections. She is currently taking insulin pre-breakfast, pre-lunch, pre-dinner and at bedtime. Her overall blood glucose range is 130-140 mg/dl. Past Medical History:   Diagnosis Date    Asthma     Diabetes mellitus ([address]     Hypertension     Migraines      No past surgical history on file.   Social History     Socioeconomic History    Marital status: Single Spouse name: Not on file    Number of children: Not on file    Years of education: Not on file    Highest education level: Not on file   Occupational History    Not on file   Tobacco Use    Smoking status: Every Day     Types: Cigarettes    Smokeless tobacco: Never   Substance and Sexual Activity    Alcohol use: Not Currently    Drug use: Never    Sexual activity: Not on file   Other Topics Concern    Not on file   Social History Narrative    Not on file     Social Determinants of Health     Financial Resource Strain: Not on file   Food Insecurity: Not on file   Transportation Needs: Not on file   Physical Activity: Not on file   Stress: Not on file   Social Connections: Not on file   Intimate Partner Violence: Not on file   Housing Stability: Not on file     No family history on file.   No Known Allergies    Current Outpatient Medications:     traMADol (ULTRAM) 50 MG tablet, , Disp: , Rfl:     pioglitazone (ACTOS) 30 MG tablet, , Disp: , Rfl:     pantoprazole (PROTONIX) 40 MG tablet, , Disp: , Rfl:     nicotine (NICODERM CQ) 21 MG/24HR, , Disp: , Rfl:     metoprolol tartrate (LOPRESSOR) 25 MG tablet, , Disp: , Rfl:     melatonin 1 MG tablet, , Disp: , Rfl:     loperamide (IMODIUM) 2 MG capsule, , Disp: , Rfl:     EASY COMFORT PEN NEEDLES 32G X 4 MM MISC, , Disp: , Rfl:     famotidine (PEPCID) 20 MG tablet, , Disp: , Rfl:     D 5000 125 MCG (5000 UT) CAPS capsule, , Disp: , Rfl:     busPIRone (BUSPAR) 10 MG tablet, , Disp: , Rfl:     Blood Glucose Monitoring Suppl (ONE TOUCH ULTRA 2) w/Device KIT, , Disp: , Rfl:     atorvastatin (LIPITOR) 10 MG tablet, , Disp: , Rfl:     ASPIRIN LOW DOSE 81 MG EC tablet, , Disp: , Rfl:     LANTUS SOLOSTAR 100 UNIT/ML injection pen, 35  units at bedtime, Disp: 5 Adjustable Dose Pre-filled Pen Syringe, Rfl: 4    insulin lispro, 1 Unit Dial, (HUMALOG/ADMELOG) 100 UNIT/ML SOPN, 151-200 4 units 201-250 6 units 251-300 8 units 310-350 10 units 351-400 12 units 401-450 14 units 451-500 16 units, Disp: 10 Adjustable Dose Pre-filled Pen Syringe, Rfl: 3    Continuous Blood Gluc Sensor (FREESTYLE BLESSING 2 SENSOR) [facility] [place], Every 2 weeks, Disp: 2 each, Rfl: 4    Continuous Blood Gluc  (FREESTYLE BLESSING 2 READER) [facility], As directed, Disp: 1 each, Rfl: 0    Multiple Vitamins-Minerals (MULTI FOR HER 50+ PO), , Disp: , Rfl:     acetaminophen (TYLENOL) 325 MG tablet, , Disp: , Rfl:     GNP Alcohol Swabs 70 % PADS, , Disp: , Rfl:     Calcium Carb-Cholecalciferol (OYSTER SHELL CALCIUM W/D) 500-200 MG-UNIT TABS tablet, , Disp: , Rfl:     clindamycin (CLEOCIN) 300 MG capsule, , Disp: , Rfl:     cloNIDine (CATAPRES) 0.2 MG tablet, , Disp: , Rfl:     clopidogrel (PLAVIX) 75 MG tablet, Take 75 mg by mouth daily, Disp: , Rfl:     enalapril (VASOTEC) 10 MG tablet, Take 10 mg by mouth 2 times daily, Disp: , Rfl:     folic acid (FOLVITE) 164 MCG tablet, , Disp: , Rfl:     glimepiride (AMARYL) 1 MG tablet, , Disp: , Rfl:     ONETOUCH ULTRA strip, , Disp: , Rfl:     glyBURIDE-metFORMIN (GLUCOVANCE) 5-500 MG per tablet, Take 1 tablet by mouth daily (with breakfast), Disp: , Rfl:     Lancets (BD LANCET ULTRAFINE 30G) MISC, , Disp: , Rfl:     lisinopril (PRINIVIL;ZESTRIL) 20 MG tablet, , Disp: , Rfl:     meloxicam (MOBIC) 15 MG tablet, , Disp: , Rfl:     metFORMIN (GLUCOPHAGE) 1000 MG tablet, , Disp: , Rfl:     mirtazapine (REMERON) 15 MG tablet, , Disp: , Rfl:     mupirocin (BACTROBAN) 2 % ointment, , Disp: , Rfl:     raNITIdine (ZANTAC) 150 MG tablet, Take 150 mg by mouth 2 times daily, Disp: , Rfl:     simvastatin (ZOCOR) 20 MG tablet, Take 20 mg by mouth nightly, Disp: , Rfl:     JANUVIA 100 MG tablet, , Disp: , Rfl:     traMADol-acetaminophen (ULTRACET) 37.5-325 MG per tablet, , Disp: , Rfl:   Lab Results   Component Value Date     10/27/2022    K 3.0 (LL) 10/27/2022     (H) 10/27/2022    CO2 20 10/27/2022    BUN 13 10/27/2022    CREATININE 0.5 (L) 10/27/2022    GLUCOSE 225 01/18/2023    CALCIUM 6.2 (L) 10/27/2022    PROT 4.2 (L) 10/27/2022    LABALBU 2.5 (L) 10/27/2022    BILITOT <0.2 10/27/2022    ALKPHOS 121 10/27/2022    AST 9 10/27/2022    ALT 19 10/27/2022    LABGLOM >60 10/27/2022    GFRAA >60.0 10/19/2018    GLOB 1.7 (L) 10/27/2022     Lab Results   Component Value Date    WBC 8.6 10/27/2022    HGB 15.6 10/27/2022    HCT 37.5 10/27/2022    MCV 94.2 10/27/2022     (L) 10/27/2022     Lab Results   Component Value Date    LABA1C 6.9 12/20/2022     No results found for: CHOLFAST, TRIGLYCFAST, HDL, LDLCALC, CHOL, TRIG  No results found for: TESTM  Lab Results   Component Value Date    TSH 1.49 11/09/2022     No results found for: TPOABS    Review of Systems   Constitutional:  Negative for weight loss. Eyes: Negative. Cardiovascular: Negative. Endocrine: Negative. Negative for polyuria. All other systems reviewed and are negative. Objective:   Physical Exam  Vitals reviewed. Constitutional:       General: She is not in acute distress. Appearance: Normal appearance. HENT:      Head: Normocephalic and atraumatic. Right Ear: External ear normal.      Left Ear: External ear normal.      Nose: Nose normal.   Eyes:      General: No scleral icterus. Right eye: No discharge. Left eye: No discharge. Extraocular Movements: Extraocular movements intact. Conjunctiva/sclera: Conjunctivae normal.   Cardiovascular:      Rate and Rhythm: Normal rate. Pulmonary:      Effort: Pulmonary effort is normal.   Musculoskeletal:         General: Normal range of motion. Cervical back: Normal range of motion and neck supple. Neurological:      General: No focal deficit present. Mental Status: She is alert and oriented to person, place, and time.    Psychiatric:         Mood and Affect: Mood normal.         Behavior: Behavior normal.

## 2023-01-24 ASSESSMENT — ENCOUNTER SYMPTOMS: EYES NEGATIVE: 1

## 2023-03-24 ENCOUNTER — TELEPHONE (OUTPATIENT)
Dept: CARDIOLOGY CLINIC | Age: 58
End: 2023-03-24

## 2023-03-24 NOTE — TELEPHONE ENCOUNTER
Unable to reach pt to schedule a new pt appt. Received a referral from [facility]. Reasoning/ diagnosis; Tachycardia (R00.0)    Referral scanned into .

## 2023-04-25 RX ORDER — INSULIN LISPRO 100 [IU]/ML
INJECTION, SOLUTION INTRAVENOUS; SUBCUTANEOUS
Qty: 15 ML | Refills: 3 | Status: SHIPPED | OUTPATIENT
Start: 2023-04-25

## 2023-07-13 ENCOUNTER — TELEPHONE (OUTPATIENT)
Dept: ENDOCRINOLOGY | Age: 58
End: 2023-07-13

## 2023-07-13 DIAGNOSIS — E11.65 INADEQUATELY CONTROLLED DIABETES MELLITUS (HCC): ICD-10-CM

## 2023-07-13 LAB
ANION GAP SERPL CALCULATED.3IONS-SCNC: 10 MEQ/L (ref 9–15)
BUN SERPL-MCNC: 21 MG/DL (ref 6–20)
CALCIUM SERPL-MCNC: 9.2 MG/DL (ref 8.5–9.9)
CHLORIDE SERPL-SCNC: 101 MEQ/L (ref 95–107)
CHOLEST SERPL-MCNC: 128 MG/DL (ref 0–199)
CO2 SERPL-SCNC: 26 MEQ/L (ref 20–31)
CREAT SERPL-MCNC: 0.79 MG/DL (ref 0.5–0.9)
GLUCOSE SERPL-MCNC: 413 MG/DL (ref 70–99)
HBA1C MFR BLD: 9.7 % (ref 4.8–5.9)
HDLC SERPL-MCNC: 49 MG/DL (ref 40–59)
LDLC SERPL CALC-MCNC: 54 MG/DL (ref 0–129)
POTASSIUM SERPL-SCNC: 4.4 MEQ/L (ref 3.4–4.9)
SODIUM SERPL-SCNC: 137 MEQ/L (ref 135–144)
TRIGL SERPL-MCNC: 127 MG/DL (ref 0–150)

## 2023-07-14 LAB — C PEPTIDE SERPL-MCNC: 4.4 NG/ML (ref 1.1–4.4)

## 2023-07-15 LAB — GAD65 AB SER IA-ACNC: <5 IU/ML (ref 0–5)

## 2023-07-25 RX ORDER — INSULIN GLARGINE 100 [IU]/ML
INJECTION, SOLUTION SUBCUTANEOUS
Qty: 15 ML | Refills: 4 | Status: SHIPPED | OUTPATIENT
Start: 2023-07-25

## 2024-03-05 ENCOUNTER — OFFICE VISIT (OUTPATIENT)
Dept: ENDOCRINOLOGY | Age: 59
End: 2024-03-05
Payer: MEDICAID

## 2024-03-05 VITALS
HEART RATE: 79 BPM | HEIGHT: 62 IN | WEIGHT: 130 LBS | BODY MASS INDEX: 23.92 KG/M2 | DIASTOLIC BLOOD PRESSURE: 83 MMHG | OXYGEN SATURATION: 96 % | SYSTOLIC BLOOD PRESSURE: 132 MMHG

## 2024-03-05 DIAGNOSIS — E11.65 POORLY CONTROLLED DIABETES MELLITUS (HCC): Primary | ICD-10-CM

## 2024-03-05 LAB — GLUCOSE BLD-MCNC: 496 MG/DL

## 2024-03-05 PROCEDURE — 3046F HEMOGLOBIN A1C LEVEL >9.0%: CPT | Performed by: INTERNAL MEDICINE

## 2024-03-05 PROCEDURE — G8484 FLU IMMUNIZE NO ADMIN: HCPCS | Performed by: INTERNAL MEDICINE

## 2024-03-05 PROCEDURE — G8420 CALC BMI NORM PARAMETERS: HCPCS | Performed by: INTERNAL MEDICINE

## 2024-03-05 PROCEDURE — 82962 GLUCOSE BLOOD TEST: CPT | Performed by: INTERNAL MEDICINE

## 2024-03-05 PROCEDURE — 99213 OFFICE O/P EST LOW 20 MIN: CPT | Performed by: INTERNAL MEDICINE

## 2024-03-05 PROCEDURE — 4004F PT TOBACCO SCREEN RCVD TLK: CPT | Performed by: INTERNAL MEDICINE

## 2024-03-05 PROCEDURE — G8427 DOCREV CUR MEDS BY ELIG CLIN: HCPCS | Performed by: INTERNAL MEDICINE

## 2024-03-05 PROCEDURE — 2022F DILAT RTA XM EVC RTNOPTHY: CPT | Performed by: INTERNAL MEDICINE

## 2024-03-05 PROCEDURE — 3017F COLORECTAL CA SCREEN DOC REV: CPT | Performed by: INTERNAL MEDICINE

## 2024-03-05 RX ORDER — ISOPROPYL ALCOHOL 70 ML/100ML
SWAB TOPICAL
Qty: 100 EACH | Refills: 3 | Status: SHIPPED | OUTPATIENT
Start: 2024-03-05

## 2024-03-05 RX ORDER — PEN NEEDLE, DIABETIC 32 GX 1/6"
NEEDLE, DISPOSABLE MISCELLANEOUS
Qty: 200 EACH | Refills: 3 | Status: SHIPPED | OUTPATIENT
Start: 2024-03-05

## 2024-03-05 RX ORDER — ERGOCALCIFEROL 1.25 MG/1
CAPSULE ORAL
COMMUNITY
Start: 2024-02-27

## 2024-03-05 RX ORDER — INSULIN GLARGINE 100 [IU]/ML
INJECTION, SOLUTION SUBCUTANEOUS
Qty: 15 ML | Refills: 4 | Status: SHIPPED | OUTPATIENT
Start: 2024-03-05

## 2024-03-05 RX ORDER — INSULIN LISPRO 100 [IU]/ML
INJECTION, SOLUTION INTRAVENOUS; SUBCUTANEOUS
Qty: 15 ML | Refills: 3 | Status: CANCELLED | OUTPATIENT
Start: 2024-03-05

## 2024-03-05 RX ORDER — BLOOD SUGAR DIAGNOSTIC
STRIP MISCELLANEOUS
Qty: 100 EACH | Refills: 3 | Status: SHIPPED | OUTPATIENT
Start: 2024-03-05

## 2024-03-05 RX ORDER — INSULIN LISPRO 100 [IU]/ML
INJECTION, SOLUTION INTRAVENOUS; SUBCUTANEOUS
Qty: 10 ADJUSTABLE DOSE PRE-FILLED PEN SYRINGE | Refills: 3 | Status: SHIPPED | OUTPATIENT
Start: 2024-03-05

## 2024-03-05 NOTE — PROGRESS NOTES
3/5/2024    Assessment:       Diagnosis Orders   1. Poorly controlled diabetes mellitus ([facility])  POCT Glucose            PLAN:     Orders Placed This Encounter   Procedures    Microalbumin / Creatinine Urine Ratio     Standing Status:   Future     Standing Expiration Date:   3/5/2025    Hemoglobin A1C     Standing Status:   Future     Standing Expiration Date:   3/5/2025    Basic Metabolic Panel     Standing Status:   Future     Standing Expiration Date:   3/5/2025    Lipid Panel     Standing Status:   Future     Standing Expiration Date:   3/5/2025    POCT Glucose    HM DIABETES FOOT EXAM     Orders Placed This Encounter   Medications    GNP Alcohol Swabs 70 % PADS     Sig: 3x daily     Dispense:  100 each     Refill:  3    ONETOUCH ULTRA strip     Sig: 3xdaily e11.65     Dispense:  100 each     Refill:  3    LANTUS SOLOSTAR 100 UNIT/ML injection pen     Si units at bedtime     Dispense:  15 mL     Refill:  4    insulin lispro, 1 Unit Dial, (HUMALOG KWIKPEN) 100 UNIT/ML SOPN     Sig: 10 units at  each meals     Dispense:  10 Adjustable Dose Pre-filled Pen Syringe     Refill:  3    Insulin Pen Needle (NOVOFINE PLUS PEN NEEDLE) 32G X 4 MM MISC     Sig: qid     Dispense:  200 each     Refill:  3     Increase dose of Lantus to 40 Humalog 10 units with each meals A1c goal of less than 7  Compliance stressed    Orders Placed This Encounter   Procedures    POCT Glucose     No orders of the defined types were placed in this encounter.    No follow-ups on file.  Subjective:     Chief Complaint   Patient presents with    Diabetes     A1C 13.1 2-15-24     Vitals:    24 1558   BP: 132/83   Pulse: 79   SpO2: 96%   Weight: 59 kg (130 lb)   Height: 1.575 m (5' 2\")     Wt Readings from Last 3 Encounters:   24 59 kg (130 lb)   23 49.9 kg (110 lb)   22 58.5 kg (129 lb)     BP Readings from Last 3 Encounters:   24 132/83   23 (!) 140/81   22 129/78         Follow-up type diabetes patient

## 2024-05-28 RX ORDER — INSULIN GLARGINE 100 [IU]/ML
INJECTION, SOLUTION SUBCUTANEOUS
Qty: 15 ML | Refills: 4 | Status: SHIPPED | OUTPATIENT
Start: 2024-05-28

## 2024-06-05 ENCOUNTER — TRANSCRIBE ORDERS (OUTPATIENT)
Dept: ADMINISTRATIVE | Age: 59
End: 2024-06-05

## 2024-06-05 DIAGNOSIS — R10.9 ACUTE ABDOMINAL PAIN: Primary | ICD-10-CM

## 2024-06-11 ENCOUNTER — HOSPITAL ENCOUNTER (OUTPATIENT)
Dept: ULTRASOUND IMAGING | Age: 59
Discharge: HOME | End: 2024-06-13
Payer: MEDICAID

## 2024-06-11 DIAGNOSIS — R10.9 ACUTE ABDOMINAL PAIN: ICD-10-CM

## 2024-06-11 PROCEDURE — 76705 ECHO EXAM OF ABDOMEN: CPT

## 2024-06-11 PROCEDURE — 76705 ECHO EXAM OF ABDOMEN: CPT | Performed by: RADIOLOGY

## 2024-06-13 ENCOUNTER — LAB (OUTPATIENT)
Dept: LAB | Facility: LAB | Age: 59
End: 2024-06-13
Payer: MEDICAID

## 2024-06-13 ENCOUNTER — OFFICE VISIT (OUTPATIENT)
Dept: VASCULAR SURGERY | Facility: CLINIC | Age: 59
End: 2024-06-13
Payer: MEDICAID

## 2024-06-13 VITALS
SYSTOLIC BLOOD PRESSURE: 143 MMHG | TEMPERATURE: 97.1 F | OXYGEN SATURATION: 98 % | RESPIRATION RATE: 16 BRPM | WEIGHT: 153 LBS | BODY MASS INDEX: 28.16 KG/M2 | HEIGHT: 62 IN | DIASTOLIC BLOOD PRESSURE: 70 MMHG | HEART RATE: 98 BPM

## 2024-06-13 DIAGNOSIS — I73.9 PAD (PERIPHERAL ARTERY DISEASE) (CMS-HCC): ICD-10-CM

## 2024-06-13 DIAGNOSIS — N18.6 ESRD (END STAGE RENAL DISEASE) (MULTI): ICD-10-CM

## 2024-06-13 DIAGNOSIS — I70.0 AORTIC OCCLUSION (CMS-HCC): Primary | ICD-10-CM

## 2024-06-13 LAB
ANION GAP SERPL CALC-SCNC: 10 MMOL/L (ref 10–20)
BUN SERPL-MCNC: 27 MG/DL (ref 6–23)
CALCIUM SERPL-MCNC: 9.4 MG/DL (ref 8.6–10.3)
CHLORIDE SERPL-SCNC: 108 MMOL/L (ref 98–107)
CO2 SERPL-SCNC: 27 MMOL/L (ref 21–32)
CREAT SERPL-MCNC: 0.8 MG/DL (ref 0.5–1.05)
EGFRCR SERPLBLD CKD-EPI 2021: 85 ML/MIN/1.73M*2
GLUCOSE SERPL-MCNC: 216 MG/DL (ref 74–99)
POTASSIUM SERPL-SCNC: 4.4 MMOL/L (ref 3.5–5.3)
SODIUM SERPL-SCNC: 141 MMOL/L (ref 136–145)

## 2024-06-13 PROCEDURE — 36415 COLL VENOUS BLD VENIPUNCTURE: CPT

## 2024-06-13 PROCEDURE — 99215 OFFICE O/P EST HI 40 MIN: CPT | Performed by: SURGERY

## 2024-06-13 PROCEDURE — 80048 BASIC METABOLIC PNL TOTAL CA: CPT

## 2024-06-13 RX ORDER — INSULIN GLARGINE 100 [IU]/ML
35 INJECTION, SOLUTION SUBCUTANEOUS NIGHTLY
COMMUNITY
Start: 2022-12-09

## 2024-06-13 RX ORDER — BUSPIRONE HYDROCHLORIDE 15 MG/1
15 TABLET ORAL DAILY
COMMUNITY
Start: 2024-03-18 | End: 2024-06-13 | Stop reason: ENTERED-IN-ERROR

## 2024-06-13 RX ORDER — MIRTAZAPINE 15 MG/1
15 TABLET, ORALLY DISINTEGRATING ORAL NIGHTLY
COMMUNITY

## 2024-06-13 RX ORDER — INSULIN LISPRO 100 [IU]/ML
INJECTION, SOLUTION INTRAVENOUS; SUBCUTANEOUS
COMMUNITY
Start: 2023-04-25

## 2024-06-13 RX ORDER — FAMOTIDINE 20 MG/1
20 TABLET, FILM COATED ORAL 2 TIMES DAILY
COMMUNITY
Start: 2022-12-14

## 2024-06-13 RX ORDER — ATORVASTATIN CALCIUM 20 MG/1
20 TABLET, FILM COATED ORAL DAILY
COMMUNITY
Start: 2024-05-15

## 2024-06-13 RX ORDER — ASPIRIN 81 MG/1
81 TABLET ORAL DAILY
COMMUNITY
Start: 2022-11-11

## 2024-06-13 RX ORDER — METOPROLOL TARTRATE 50 MG/1
50 TABLET ORAL 2 TIMES DAILY
COMMUNITY
Start: 2022-12-14

## 2024-06-13 ASSESSMENT — PATIENT HEALTH QUESTIONNAIRE - PHQ9
SUM OF ALL RESPONSES TO PHQ9 QUESTIONS 1 AND 2: 0
2. FEELING DOWN, DEPRESSED OR HOPELESS: NOT AT ALL
1. LITTLE INTEREST OR PLEASURE IN DOING THINGS: NOT AT ALL

## 2024-06-13 NOTE — PROGRESS NOTES
Vascular Surgery Clinic Note    CC: abdominal pain    HPI:  Bertha Ramirez is 59 y.o. female with history of aortic occlusion s/p ABF in 12/2022. She did well after that and rest pain resolved.  Now returned from [place] about six months ago and has been having abdominal discomfort/swelling/tenderness since that time.    Medical History:   has no past medical history on file.    Meds:   Current Outpatient Medications on File Prior to Visit   Medication Sig Dispense Refill    aspirin 81 mg EC tablet Take 1 tablet (81 mg) by mouth once daily.      atorvastatin (Lipitor) 20 mg tablet Take 1 tablet (20 mg) by mouth once daily.      famotidine (Pepcid) 20 mg tablet Take 1 tablet (20 mg) by mouth 2 times a day.      insulin glargine (Lantus) 100 unit/mL injection Inject 35 Units under the skin once daily at bedtime.      insulin lispro (HumaLOG) 100 unit/mL injection Inject under the skin 3 times daily (morning, midday, late afternoon). Sliding scale with meals      metoprolol tartrate (Lopressor) 50 mg tablet Take 1 tablet by mouth twice a day.      [DISCONTINUED] busPIRone (Buspar) 15 mg tablet Take 1 tablet (15 mg) by mouth once daily.      mirtazapine (Remeron Sol-Tab) 15 mg disintegrating tablet Take 1 tablet (15 mg) by mouth once daily at bedtime.       No current facility-administered medications on file prior to visit.        Allergies:   Not on File    SH:    Social Determinants of Health     Tobacco Use: High Risk (6/13/2024)    Patient History     Smoking Tobacco Use: Every Day     Smokeless Tobacco Use: Never     Passive Exposure: Not on file   Alcohol Use: Not on file   Financial Resource Strain: Not on file   Food Insecurity: Not on file   Transportation Needs: Not on file   Physical Activity: Not on file   Stress: Not on file   Social Connections: Not on file   Intimate Partner Violence: Not on file   Depression: Not at risk (6/13/2024)    PHQ-2     PHQ-2 Score: 0   Housing Stability: Not on file    Utilities: Not on file   Digital Equity: Not on file   Health Literacy: Not on file        FH:  No family history on file.     ROS:  All systems were reviewed and are negative except as per HPI.    Objective:  Vitals:  Vitals:    06/13/24 0930   BP: 143/70   Pulse: 98   Resp: 16   Temp: 36.2 °C (97.1 °F)   SpO2: 98%        Exam:  In NAD, well appearing  Abd Soft, ND/NT, no obvious hernia, incision is healed, no erythema  Vascular examination:  Palpable femoral and PT/DP bilaterally, feet are warm, no wounds    Assessment & Plan:  Bertha Ramirez is 59 y.o. female s/p Aortobifem 18 mo ago. Has been having abdominal discomfort and incisional pain for about six months. I will get a CTA to check the reconstruction and evaluate for hernia.      I spent a total of 40 minutes on the day of the visit.         Velasquez Mcknight M.D.

## 2024-06-27 ENCOUNTER — HOSPITAL ENCOUNTER (OUTPATIENT)
Dept: RADIOLOGY | Facility: HOSPITAL | Age: 59
Discharge: HOME | End: 2024-06-27
Payer: MEDICAID

## 2024-06-27 DIAGNOSIS — I70.0 AORTIC OCCLUSION (CMS-HCC): ICD-10-CM

## 2024-06-27 PROCEDURE — 74174 CTA ABD&PLVS W/CONTRAST: CPT

## 2024-06-27 PROCEDURE — 2550000001 HC RX 255 CONTRASTS: Performed by: SURGERY

## 2024-07-01 PROBLEM — I73.9 PAD (PERIPHERAL ARTERY DISEASE) (CMS-HCC): Status: ACTIVE | Noted: 2024-07-01

## 2024-07-01 RX ORDER — PEN NEEDLE, DIABETIC 32GX 5/32"
NEEDLE, DISPOSABLE MISCELLANEOUS
COMMUNITY
Start: 2024-06-13

## 2024-07-01 RX ORDER — BLOOD SUGAR DIAGNOSTIC
STRIP MISCELLANEOUS
COMMUNITY
Start: 2024-06-13

## 2024-07-03 ENCOUNTER — TELEMEDICINE (OUTPATIENT)
Dept: VASCULAR SURGERY | Facility: HOSPITAL | Age: 59
End: 2024-07-03
Payer: MEDICAID

## 2024-07-03 DIAGNOSIS — I73.9 PERIPHERAL VASCULAR DISEASE, UNSPECIFIED (CMS-HCC): ICD-10-CM

## 2024-07-03 DIAGNOSIS — R10.84 GENERALIZED ABDOMINAL PAIN: Primary | ICD-10-CM

## 2024-07-03 PROCEDURE — 99213 OFFICE O/P EST LOW 20 MIN: CPT | Performed by: SURGERY

## 2024-07-03 NOTE — PROGRESS NOTES
Vascular Surgery Clinic Note    CC: abdominal pain    HPI:  Bertha Ramirez is 59 y.o. female with history of aortobifemoral bypass 18 months ago. She reports at least six months of abdominal pain. I reviewed recent CTA that shows widely patent vascular reconstruction and no hernia. There was possible cystitis, and hepatic steatosis.     Medical History:   has no past medical history on file.    Meds:   Current Outpatient Medications on File Prior to Visit   Medication Sig Dispense Refill    OneTouch Ultra Test strip       Pro Comfort Alcohol Pads pads, medicated       aspirin 81 mg EC tablet Take 1 tablet (81 mg) by mouth once daily.      atorvastatin (Lipitor) 20 mg tablet Take 1 tablet (20 mg) by mouth once daily.      famotidine (Pepcid) 20 mg tablet Take 1 tablet (20 mg) by mouth 2 times a day.      insulin glargine (Lantus) 100 unit/mL injection Inject 35 Units under the skin once daily at bedtime.      insulin lispro (HumaLOG) 100 unit/mL injection Inject under the skin 3 times daily (morning, midday, late afternoon). Sliding scale with meals      metoprolol tartrate (Lopressor) 50 mg tablet Take 1 tablet by mouth twice a day.      mirtazapine (Remeron Sol-Tab) 15 mg disintegrating tablet Take 1 tablet (15 mg) by mouth once daily at bedtime.       No current facility-administered medications on file prior to visit.        Allergies:   No Known Allergies    SH:    Social Determinants of Health     Tobacco Use: High Risk (6/24/2024)    Received from [facility]    Patient History     Smoking Tobacco Use: Every Day     Smokeless Tobacco Use: Never     Passive Exposure: Not on file   Alcohol Use: Not on file   Financial Resource Strain: Not on file   Food Insecurity: Not on file   Transportation Needs: Not on file   Physical Activity: Not on file   Stress: Not on file   Social Connections: Not on file   Intimate Partner Violence: Not on file   Depression: Not at risk (6/13/2024)    PHQ-2     PHQ-2 Score: 0    Housing Stability: Not on file   Utilities: Not on file   Digital Equity: Not on file   Health Literacy: Not on file        FH:  No family history on file.     ROS:  All systems were reviewed and are negative except as per HPI.    Assessment & Plan:  Bertha Ramirez is 59 y.o. female with abdominal pain. I have ordered a UA. I have asked her to follow up with PCP about cystitis and hepatic steatosis. RTC yearly for LUIS.      I spent a total of 20 minutes on the day of the visit.         Velasquez Mcknight M.D.

## 2024-07-13 DIAGNOSIS — E11.65 POORLY CONTROLLED DIABETES MELLITUS (HCC): ICD-10-CM

## 2024-07-15 RX ORDER — BLOOD SUGAR DIAGNOSTIC
STRIP MISCELLANEOUS
Qty: 100 STRIP | Refills: 3 | Status: SHIPPED | OUTPATIENT
Start: 2024-07-15

## 2024-07-15 RX ORDER — PEN NEEDLE, DIABETIC 32GX 5/32"
NEEDLE, DISPOSABLE MISCELLANEOUS
Qty: 100 EACH | Refills: 5 | Status: SHIPPED | OUTPATIENT
Start: 2024-07-15

## 2024-09-16 RX ORDER — INSULIN LISPRO 100 [IU]/ML
INJECTION, SOLUTION INTRAVENOUS; SUBCUTANEOUS
Qty: 15 ML | Refills: 3 | Status: SHIPPED | OUTPATIENT
Start: 2024-09-16

## 2024-09-16 RX ORDER — PEN NEEDLE, DIABETIC 32 GX 1/6"
NEEDLE, DISPOSABLE MISCELLANEOUS
Qty: 200 EACH | Refills: 3 | Status: SHIPPED | OUTPATIENT
Start: 2024-09-16

## 2024-09-30 ENCOUNTER — OFFICE VISIT (OUTPATIENT)
Dept: ENDOCRINOLOGY | Age: 59
End: 2024-09-30
Payer: MEDICAID

## 2024-09-30 VITALS
SYSTOLIC BLOOD PRESSURE: 139 MMHG | HEART RATE: 68 BPM | WEIGHT: 158 LBS | HEIGHT: 62 IN | BODY MASS INDEX: 29.08 KG/M2 | DIASTOLIC BLOOD PRESSURE: 82 MMHG

## 2024-09-30 DIAGNOSIS — E11.65 POORLY CONTROLLED DIABETES MELLITUS (HCC): Primary | ICD-10-CM

## 2024-09-30 LAB
CHP ED QC CHECK: NORMAL
GLUCOSE BLD-MCNC: 306 MG/DL
HBA1C MFR BLD: 6.9 %

## 2024-09-30 PROCEDURE — 2022F DILAT RTA XM EVC RTNOPTHY: CPT | Performed by: INTERNAL MEDICINE

## 2024-09-30 PROCEDURE — 82962 GLUCOSE BLOOD TEST: CPT | Performed by: INTERNAL MEDICINE

## 2024-09-30 PROCEDURE — 99213 OFFICE O/P EST LOW 20 MIN: CPT | Performed by: INTERNAL MEDICINE

## 2024-09-30 PROCEDURE — 83036 HEMOGLOBIN GLYCOSYLATED A1C: CPT | Performed by: INTERNAL MEDICINE

## 2024-09-30 PROCEDURE — 3044F HG A1C LEVEL LT 7.0%: CPT | Performed by: INTERNAL MEDICINE

## 2024-09-30 PROCEDURE — 3017F COLORECTAL CA SCREEN DOC REV: CPT | Performed by: INTERNAL MEDICINE

## 2024-09-30 PROCEDURE — G8419 CALC BMI OUT NRM PARAM NOF/U: HCPCS | Performed by: INTERNAL MEDICINE

## 2024-09-30 PROCEDURE — 4004F PT TOBACCO SCREEN RCVD TLK: CPT | Performed by: INTERNAL MEDICINE

## 2024-09-30 PROCEDURE — G8427 DOCREV CUR MEDS BY ELIG CLIN: HCPCS | Performed by: INTERNAL MEDICINE

## 2024-09-30 NOTE — PROGRESS NOTES
4    Vitamin D, Ergocalciferol, 38108 units CAPS, , Disp: , Rfl:     insulin lispro, 1 Unit Dial, (HUMALOG/ADMELOG) 100 UNIT/ML SOPN, INJECT BASED ON SLIDING SCALE., Disp: 15 mL, Rfl: 3    traMADol (ULTRAM) 50 MG tablet, , Disp: , Rfl:     pioglitazone (ACTOS) 30 MG tablet, , Disp: , Rfl:     pantoprazole (PROTONIX) 40 MG tablet, , Disp: , Rfl:     nicotine (NICODERM CQ) 21 MG/24HR, , Disp: , Rfl:     metoprolol tartrate (LOPRESSOR) 25 MG tablet, , Disp: , Rfl:     melatonin 1 MG tablet, , Disp: , Rfl:     loperamide (IMODIUM) 2 MG capsule, , Disp: , Rfl:     EASY COMFORT PEN NEEDLES 32G X 4 MM MISC, , Disp: , Rfl:     famotidine (PEPCID) 20 MG tablet, , Disp: , Rfl:     D 5000 125 MCG (5000 UT) CAPS capsule, , Disp: , Rfl:     busPIRone (BUSPAR) 10 MG tablet, , Disp: , Rfl:     atorvastatin (LIPITOR) 10 MG tablet, , Disp: , Rfl:     ASPIRIN LOW DOSE 81 MG EC tablet, , Disp: , Rfl:     Continuous Blood Gluc  (FREESTYLE BLESSING 2 READER) [facility], As directed, Disp: 1 each, Rfl: 0    Multiple Vitamins-Minerals (MULTI FOR HER 50+ PO), , Disp: , Rfl:     acetaminophen (TYLENOL) 325 MG tablet, , Disp: , Rfl:     Calcium Carb-Cholecalciferol (OYSTER SHELL CALCIUM W/D) 500-200 MG-UNIT TABS tablet, , Disp: , Rfl:     clindamycin (CLEOCIN) 300 MG capsule, , Disp: , Rfl:     cloNIDine (CATAPRES) 0.2 MG tablet, , Disp: , Rfl:     clopidogrel (PLAVIX) 75 MG tablet, Take 1 tablet by mouth daily, Disp: , Rfl:     enalapril (VASOTEC) 10 MG tablet, Take 1 tablet by mouth 2 times daily, Disp: , Rfl:     folic acid (FOLVITE) 800 MCG tablet, , Disp: , Rfl:     glimepiride (AMARYL) 1 MG tablet, , Disp: , Rfl:     glyBURIDE-metFORMIN (GLUCOVANCE) 5-500 MG per tablet, Take 1 tablet by mouth daily (with breakfast), Disp: , Rfl:     Lancets (BD LANCET ULTRAFINE 30G) MISC, , Disp: , Rfl:     lisinopril (PRINIVIL;ZESTRIL) 20 MG tablet, , Disp: , Rfl:     meloxicam (MOBIC) 15 MG tablet, , Disp: , Rfl:     mirtazapine (REMERON) 15 MG

## 2024-12-28 DIAGNOSIS — E11.65 POORLY CONTROLLED DIABETES MELLITUS (HCC): ICD-10-CM

## 2024-12-30 RX ORDER — PEN NEEDLE, DIABETIC 32GX 5/32"
NEEDLE, DISPOSABLE MISCELLANEOUS
Qty: 100 EACH | Refills: 5 | Status: SHIPPED | OUTPATIENT
Start: 2024-12-30

## 2025-01-25 DIAGNOSIS — E11.65 POORLY CONTROLLED DIABETES MELLITUS (HCC): ICD-10-CM

## 2025-01-27 RX ORDER — PEN NEEDLE, DIABETIC 32GX 5/32"
NEEDLE, DISPOSABLE MISCELLANEOUS
Qty: 100 EACH | Refills: 5 | Status: SHIPPED | OUTPATIENT
Start: 2025-01-27

## 2025-03-24 RX ORDER — INSULIN LISPRO 100 [IU]/ML
INJECTION, SOLUTION INTRAVENOUS; SUBCUTANEOUS
Qty: 15 ML | Refills: 4 | Status: SHIPPED | OUTPATIENT
Start: 2025-03-24

## 2025-03-24 RX ORDER — PEN NEEDLE, DIABETIC 32GX 5/32"
NEEDLE, DISPOSABLE MISCELLANEOUS
Qty: 200 EACH | Refills: 4 | Status: SHIPPED | OUTPATIENT
Start: 2025-03-24

## 2025-04-01 RX ORDER — PEN NEEDLE, DIABETIC 32 GX 1/6"
NEEDLE, DISPOSABLE MISCELLANEOUS
Refills: 4 | OUTPATIENT
Start: 2025-04-01

## 2025-07-03 ENCOUNTER — APPOINTMENT (OUTPATIENT)
Dept: RADIOLOGY | Facility: HOSPITAL | Age: 60
End: 2025-07-03